# Patient Record
(demographics unavailable — no encounter records)

---

## 2025-01-10 NOTE — HISTORY OF PRESENT ILLNESS
[de-identified] : - 1/10/25 29-year-old gentleman c/o runny nose, nasal congestion, headaches, sinus pressure (forehead and malar), fatigue, brain fog, hyposmia x 2 weeks. Able to smell strong scents but trouble with others, taste food not as well for same time frame. From blowing his nose he also has pressure in his ears. No fevers, dental sensitivity. States long history of sinusitis about once a year x many years. History of septoplasty 3 years ago in [facility] which helped his nasal breathing. Symptoms come and go for a week at a time but this episode has been going on for two weeks. No known bruxism. In terms of treatments, using home made saltwater in his nose. Usually this helps his symptoms but this time it did not.

## 2025-01-10 NOTE — PROCEDURE
[FreeTextEntry6] : - Procedure: Bilateral nasal endoscopy Pre-operative Diagnosis: sinus pressure, hyposmia, congestion, rhinorrhea Post-operative Diagnosis: sinusitis Anesthesia: Topical lidocaine and topical oxymetazoline   Procedure Details: After topical anesthesia and decongestant, the patient was placed in the supine position. The endoscope was passed along the left nasal floor to the nasopharynx. It was then passed into the region of the middle meatus, middle turbinate, and the sphenoethmoid region. An identical procedure was performed on the right side.   Findings: Mucosa:   normal Nasal septum: midline Discharge:  +bilateral purulent secretions Turbinates:  + IT hjypertrophy Adenoids:   normal Posterior choanae:  normal Eustachian tube orifices:  patent Mucous stranding:  normal Lesions:   Not present

## 2025-01-10 NOTE — HISTORY OF PRESENT ILLNESS
[de-identified] : - 1/10/25 29-year-old gentleman c/o runny nose, nasal congestion, headaches, sinus pressure (forehead and malar), fatigue, brain fog, hyposmia x 2 weeks. Able to smell strong scents but trouble with others, taste food not as well for same time frame. From blowing his nose he also has pressure in his ears. No fevers, dental sensitivity. States long history of sinusitis about once a year x many years. History of septoplasty 3 years ago in [facility] which helped his nasal breathing. Symptoms come and go for a week at a time but this episode has been going on for two weeks. No known bruxism. In terms of treatments, using home made saltwater in his nose. Usually this helps his symptoms but this time it did not.

## 2025-01-10 NOTE — PHYSICAL EXAM
Patient says he spoke to a nurse about his urinalysis results, but now he has some questions - please c/b at #[phone]. [Nasal Endoscopy Performed] : nasal endoscopy was performed, see procedure section for findings [Normal] : mucosa is normal [Midline] : trachea located in midline position [Removed] : palatine tonsils previously removed [de-identified] : hypertrophy [de-identified] : purulent

## 2025-01-10 NOTE — PHYSICAL EXAM
[Nasal Endoscopy Performed] : nasal endoscopy was performed, see procedure section for findings [Normal] : mucosa is normal [Midline] : trachea located in midline position [Removed] : palatine tonsils previously removed [de-identified] : hypertrophy [de-identified] : purulent

## 2025-01-10 NOTE — ASSESSMENT
[FreeTextEntry1] : - 1/10/25 29-year-old gentleman c/o runny nose, nasal congestion, headaches, sinus pressure (forehead and malar), fatigue, brain fog, hyposmia x 2 weeks. On exam there is evidence of bilateral purulent secretions. I do believe that this is consistent with sinusitis. Recommending regimented nasal saline and nasal steroids as well as a Medrol Dosepak and Augmentin BID 10 days. Patient will follow-up in 3 to 4 weeks for repeat evaluation. If symptoms are persisting we will plan for CT sinus and discuss potential surgical intervention.  - Augmentin BID 10 days - Medrol Dosepak - Daily nasal saline sinus rinses and nasal steroid spray - Follow up in 3-4 weeks or sooner prn worsening or new symptoms. If persisting will consider CT sinus.